# Patient Record
Sex: MALE | Race: WHITE | URBAN - METROPOLITAN AREA
[De-identification: names, ages, dates, MRNs, and addresses within clinical notes are randomized per-mention and may not be internally consistent; named-entity substitution may affect disease eponyms.]

---

## 2017-10-16 ENCOUNTER — OFFICE VISIT (OUTPATIENT)
Dept: URGENT CARE | Facility: URGENT CARE | Age: 27
End: 2017-10-16
Payer: COMMERCIAL

## 2017-10-16 VITALS
DIASTOLIC BLOOD PRESSURE: 68 MMHG | TEMPERATURE: 98.9 F | BODY MASS INDEX: 24.18 KG/M2 | WEIGHT: 172.7 LBS | OXYGEN SATURATION: 100 % | SYSTOLIC BLOOD PRESSURE: 130 MMHG | RESPIRATION RATE: 20 BRPM | HEIGHT: 71 IN | HEART RATE: 66 BPM

## 2017-10-16 DIAGNOSIS — R09.81 NASAL CONGESTION: ICD-10-CM

## 2017-10-16 DIAGNOSIS — J01.90 ACUTE SINUSITIS WITH SYMPTOMS > 10 DAYS: Primary | ICD-10-CM

## 2017-10-16 DIAGNOSIS — R09.89 CHEST CONGESTION: ICD-10-CM

## 2017-10-16 PROCEDURE — 99213 OFFICE O/P EST LOW 20 MIN: CPT | Performed by: PHYSICIAN ASSISTANT

## 2017-10-16 RX ORDER — AZITHROMYCIN 250 MG/1
TABLET, FILM COATED ORAL
Qty: 6 TABLET | Refills: 0 | Status: SHIPPED | OUTPATIENT
Start: 2017-10-16

## 2017-10-16 ASSESSMENT — PAIN SCALES - GENERAL: PAINLEVEL: NO PAIN (0)

## 2017-10-16 NOTE — PROGRESS NOTES
"SUBJECTIVE:   Michael Barthel is a 27 year old male presenting with a chief complaint of runny nose, stuffy nose, cough - non-productive and facial pain/pressure.  Onset of symptoms was 3 week(s) ago.  Course of illness is worsening.    Severity moderate  Current and Associated symptoms: sinus and chest congestion  Treatment measures tried include OTC Cough med.  Predisposing factors include recent illness .    PMH:  none     Allergies   Allergen Reactions     Amoxicillin      Sulfa Drugs          Social History   Substance Use Topics     Smoking status: Never Smoker     Smokeless tobacco: Never Used     Alcohol use Not on file       ROS:  CONSTITUTIONAL:NEGATIVE for fever, chills, change in weight  INTEGUMENTARY/SKIN: NEGATIVE for worrisome rashes, moles or lesions  ENT/MOUTH: POSITIVE for sinus congestion, sinus congestion  RESP:POSITIVE for coughing, chest congestion  CV: NEGATIVE for chest pain, palpitations or peripheral edema  MUSCULOSKELETAL: NEGATIVE for significant arthralgias or myalgia  NEURO: NEGATIVE for weakness, dizziness or paresthesias    OBJECTIVE  :/68 (BP Location: Right arm, Patient Position: Sitting, Cuff Size: Adult Regular)  Pulse 66  Temp 98.9  F (37.2  C) (Oral)  Resp 20  Ht 5' 11\" (1.803 m)  Wt 172 lb 11.2 oz (78.3 kg)  SpO2 100%  BMI 24.09 kg/m2  GENERAL APPEARANCE: healthy, alert and no distress  EYES: EOMI,  PERRL, conjunctiva clear  HENT: TM's normal bilaterally, nasal turbinates erythematous, swollen, rhinorrhea purulent, frontal sinus tenderness  and maxillary sinus tenderness   NECK: supple, nontender, no lymphadenopathy  RESP: lungs clear to auscultation - no rales, rhonchi or wheezes  CV: regular rates and rhythm, normal S1 S2, no murmur noted  NEURO: Normal strength and tone, sensory exam grossly normal,  normal speech and mentation  SKIN: no suspicious lesions or rashes    ASSESSMENT/PLAN:      ICD-10-CM    1. Acute sinusitis with symptoms > 10 days J01.90 " azithromycin (ZITHROMAX) 250 MG tablet   2. Nasal congestion R09.81 azithromycin (ZITHROMAX) 250 MG tablet   3. Chest congestion R09.89 azithromycin (ZITHROMAX) 250 MG tablet       Saline nasal spray  Motrin, sudafed  Follow up as needed  See orders in Epic

## 2017-10-16 NOTE — MR AVS SNAPSHOT
"              After Visit Summary   10/16/2017    Michael Barthel    MRN: [id number]           Patient Information     Date Of Birth          1990        Visit Information        Provider Department      10/16/2017 3:05 PM Betito Arreguin PA-C [facility]        Today's Diagnoses     Acute sinusitis with symptoms > 10 days    -  1    Nasal congestion        Chest congestion           Follow-ups after your visit        Who to contact     If you have questions or need follow up information about today's clinic visit or your schedule please contact [facility] [place] [facility] directly at [phone].  Normal or non-critical lab and imaging results will be communicated to you by WorldState[employer], letter or phone within 4 business days after the clinic has received the results. If you do not hear from us within 7 days, please contact the clinic through WorldState[employer] or phone. If you have a critical or abnormal lab result, we will notify you by phone as soon as possible.  Submit refill requests through Smartzer or call your pharmacy and they will forward the refill request to us. Please allow 3 business days for your refill to be completed.          Additional Information About Your Visit        MyChart Information     Smartzer lets you send messages to your doctor, view your test results, renew your prescriptions, schedule appointments and more. To sign up, go to [email or web address] . Click on \"Log in\" on the left side of the screen, which will take you to the Welcome page. Then click on \"Sign up Now\" on the right side of the page.     You will be asked to enter the access code listed below, as well as some personal information. Please follow the directions to create your username and password.     Your access code is: [email or web address]  Expires: 2018  3:39 PM     Your access code will  in 90 days. If you need help or a new code, please call your [facility] clinic or [phone].   " "     Care EveryWhere ID     This is your Care EveryWhere ID. This could be used by other organizations to access your [facility] medical records  [id number]        Your Vitals Were     Pulse Temperature Respirations Height Pulse Oximetry BMI (Body Mass Index)    66 98.9  F (37.2  C) (Oral) 20 5' 11\" (1.803 m) 100% 24.09 kg/m2       Blood Pressure from Last 3 Encounters:   10/16/17 130/68    Weight from Last 3 Encounters:   10/16/17 172 lb 11.2 oz (78.3 kg)              Today, you had the following     No orders found for display         Today's Medication Changes          These changes are accurate as of: 10/16/17  3:39 PM.  If you have any questions, ask your nurse or doctor.               Start taking these medicines.        Dose/Directions    azithromycin 250 MG tablet   Commonly known as:  ZITHROMAX   Used for:  Acute sinusitis with symptoms > 10 days, Nasal congestion, Chest congestion   Started by:  Betito Arreguin PA-C        2 tabs po qd day 1, then 1 tab po qd days 2-5   Quantity:  6 tablet   Refills:  0            Where to get your medicines      These medications were sent to [facility] [address] [place] [address] [address] & [facility]  [address] [place] [id number]    Hours:  24-hours Phone:  [phone]     azithromycin 250 MG tablet                Primary Care Provider    None Specified       No primary provider on file.        Equal Access to Services     [facility]: Hadii michelle Martínez, waaxda luqadaha, qaybta kaalmada felipeda, annabelle perkins . So [facility] [phone].    ATENCIÓN: Si habla español, tiene a sofia disposición servicios gratuitos de asistencia lingüística. Aubrey al [phone].    We comply with applicable federal civil rights laws and [place] laws. We do not discriminate on the basis of race, color, national origin, age, disability, sex, sexual orientation, or gender identity.            Thank you!     Thank you " for choosing [facility]  for your care. Our goal is always to provide you with excellent care. Hearing back from our patients is one way we can continue to improve our services. Please take a few minutes to complete the written survey that you may receive in the mail after your visit with us. Thank you!             Your Updated Medication List - Protect others around you: Learn how to safely use, store and throw away your medicines at www.disposemymeds.org.          This list is accurate as of: 10/16/17  3:39 PM.  Always use your most recent med list.                   Brand Name Dispense Instructions for use Diagnosis    azithromycin 250 MG tablet    ZITHROMAX    6 tablet    2 tabs po qd day 1, then 1 tab po qd days 2-5    Acute sinusitis with symptoms > 10 days, Nasal congestion, Chest congestion